# Patient Record
Sex: MALE | Race: OTHER | NOT HISPANIC OR LATINO | URBAN - METROPOLITAN AREA
[De-identification: names, ages, dates, MRNs, and addresses within clinical notes are randomized per-mention and may not be internally consistent; named-entity substitution may affect disease eponyms.]

---

## 2018-06-24 ENCOUNTER — EMERGENCY (EMERGENCY)
Facility: HOSPITAL | Age: 18
LOS: 1 days | Discharge: HOME | End: 2018-06-24
Attending: EMERGENCY MEDICINE | Admitting: EMERGENCY MEDICINE
Payer: MEDICAID

## 2018-06-24 VITALS
HEART RATE: 103 BPM | TEMPERATURE: 98 F | WEIGHT: 195.11 LBS | OXYGEN SATURATION: 98 % | SYSTOLIC BLOOD PRESSURE: 148 MMHG | RESPIRATION RATE: 17 BRPM | DIASTOLIC BLOOD PRESSURE: 69 MMHG

## 2018-06-24 LAB
ALBUMIN SERPL ELPH-MCNC: 4.5 G/DL — SIGNIFICANT CHANGE UP (ref 3.3–5)
ALP SERPL-CCNC: 64 U/L — SIGNIFICANT CHANGE UP (ref 40–120)
ALT FLD-CCNC: 21 U/L DA — SIGNIFICANT CHANGE UP (ref 10–45)
ANION GAP SERPL CALC-SCNC: 10 MMOL/L — SIGNIFICANT CHANGE UP (ref 5–17)
AST SERPL-CCNC: 18 U/L — SIGNIFICANT CHANGE UP (ref 10–40)
BASOPHILS # BLD AUTO: 0.1 K/UL — SIGNIFICANT CHANGE UP (ref 0–0.2)
BASOPHILS NFR BLD AUTO: 1.9 % — SIGNIFICANT CHANGE UP (ref 0–2)
BILIRUB SERPL-MCNC: 0.7 MG/DL — SIGNIFICANT CHANGE UP (ref 0.2–1.2)
BUN SERPL-MCNC: 11 MG/DL — SIGNIFICANT CHANGE UP (ref 7–23)
CALCIUM SERPL-MCNC: 9.1 MG/DL — SIGNIFICANT CHANGE UP (ref 8.4–10.5)
CHLORIDE SERPL-SCNC: 104 MMOL/L — SIGNIFICANT CHANGE UP (ref 96–108)
CO2 SERPL-SCNC: 30 MMOL/L — SIGNIFICANT CHANGE UP (ref 22–31)
CREAT SERPL-MCNC: 1.27 MG/DL — SIGNIFICANT CHANGE UP (ref 0.5–1.3)
EOSINOPHIL # BLD AUTO: 0.1 K/UL — SIGNIFICANT CHANGE UP (ref 0–0.5)
EOSINOPHIL NFR BLD AUTO: 1.7 % — SIGNIFICANT CHANGE UP (ref 0–6)
GLUCOSE BLDC GLUCOMTR-MCNC: 99 MG/DL — SIGNIFICANT CHANGE UP (ref 70–99)
GLUCOSE SERPL-MCNC: 67 MG/DL — LOW (ref 70–99)
HCT VFR BLD CALC: 45.9 % — SIGNIFICANT CHANGE UP (ref 39–50)
HGB BLD-MCNC: 16.5 G/DL — SIGNIFICANT CHANGE UP (ref 13–17)
LYMPHOCYTES # BLD AUTO: 1 K/UL — SIGNIFICANT CHANGE UP (ref 1–3.3)
LYMPHOCYTES # BLD AUTO: 21.4 % — SIGNIFICANT CHANGE UP (ref 13–44)
MCHC RBC-ENTMCNC: 31.7 PG — SIGNIFICANT CHANGE UP (ref 27–34)
MCHC RBC-ENTMCNC: 36 GM/DL — SIGNIFICANT CHANGE UP (ref 32–36)
MCV RBC AUTO: 87.9 FL — SIGNIFICANT CHANGE UP (ref 80–100)
MONOCYTES # BLD AUTO: 0.3 K/UL — SIGNIFICANT CHANGE UP (ref 0–0.9)
MONOCYTES NFR BLD AUTO: 7.2 % — SIGNIFICANT CHANGE UP (ref 1–9)
NEUTROPHILS # BLD AUTO: 3.1 K/UL — SIGNIFICANT CHANGE UP (ref 1.8–7.4)
NEUTROPHILS NFR BLD AUTO: 67.8 % — SIGNIFICANT CHANGE UP (ref 43–77)
PLATELET # BLD AUTO: 168 K/UL — SIGNIFICANT CHANGE UP (ref 150–400)
POTASSIUM SERPL-MCNC: 4.1 MMOL/L — SIGNIFICANT CHANGE UP (ref 3.5–5.3)
POTASSIUM SERPL-SCNC: 4.1 MMOL/L — SIGNIFICANT CHANGE UP (ref 3.5–5.3)
PROT SERPL-MCNC: 8.3 G/DL — SIGNIFICANT CHANGE UP (ref 6–8.3)
RBC # BLD: 5.22 M/UL — SIGNIFICANT CHANGE UP (ref 4.2–5.8)
RBC # FLD: 11.5 % — SIGNIFICANT CHANGE UP (ref 10.3–14.5)
SODIUM SERPL-SCNC: 144 MMOL/L — SIGNIFICANT CHANGE UP (ref 135–145)
WBC # BLD: 4.6 K/UL — SIGNIFICANT CHANGE UP (ref 3.8–10.5)
WBC # FLD AUTO: 4.6 K/UL — SIGNIFICANT CHANGE UP (ref 3.8–10.5)

## 2018-06-24 PROCEDURE — 36415 COLL VENOUS BLD VENIPUNCTURE: CPT

## 2018-06-24 PROCEDURE — 99284 EMERGENCY DEPT VISIT MOD MDM: CPT

## 2018-06-24 PROCEDURE — 80053 COMPREHEN METABOLIC PANEL: CPT

## 2018-06-24 PROCEDURE — 70450 CT HEAD/BRAIN W/O DYE: CPT

## 2018-06-24 PROCEDURE — 85027 COMPLETE CBC AUTOMATED: CPT

## 2018-06-24 PROCEDURE — 82962 GLUCOSE BLOOD TEST: CPT

## 2018-06-24 PROCEDURE — 70450 CT HEAD/BRAIN W/O DYE: CPT | Mod: 26

## 2018-06-24 RX ORDER — SODIUM CHLORIDE 9 MG/ML
1000 INJECTION INTRAMUSCULAR; INTRAVENOUS; SUBCUTANEOUS ONCE
Qty: 0 | Refills: 0 | Status: COMPLETED | OUTPATIENT
Start: 2018-06-24 | End: 2018-06-24

## 2018-06-24 RX ADMIN — SODIUM CHLORIDE 1000 MILLILITER(S): 9 INJECTION INTRAMUSCULAR; INTRAVENOUS; SUBCUTANEOUS at 16:30

## 2018-06-24 NOTE — ED PROVIDER NOTE - PROGRESS NOTE DETAILS
Ct negative. Pt at baseline. Pt ambulating, neurological intact. Repeat FS 99 after meal. Pt stable for discharge and to follow up with neurology outpt.

## 2018-06-24 NOTE — ED PROVIDER NOTE - OBJECTIVE STATEMENT
18 yr old male with no pmhx presents s/p seizure today at work. Pt co - worker at bedside, witnessed seizure. Pt was making a coffee, and then he had a tonic- clonic seizure, with LOC for 7 minutes as per witness. Pt baseline at this time. Pt has hx of 1 seizure 1 month ago, similar symptoms, pt was suppose to follow up with neurologist and never did. Pt reports taking a new " pre workout" for the last 2 weeks, as per pt friend also had recent seizure after taking pre workout creatinine. PT denies any headache, n/v/d, chest pain, sob or any other symptoms at this time.

## 2018-06-24 NOTE — ED PROVIDER NOTE - PSYCHIATRIC, MLM
[facility] notified.    Alert and oriented to person, place, time/situation. normal mood and affect. no apparent risk to self or others.

## 2018-06-24 NOTE — ED PROVIDER NOTE - MEDICAL DECISION MAKING DETAILS
18 yr old male with no pmhx presents s/p seizure today at work. Pt co - worker at bedside, witnessed seizure. Pt was making a coffee, and then he had a tonic- clonic seizure, with LOC for 7 minutes as per witness. Pt baseline at this time. Pt has hx of 1 seizure 1 month ago, similar symptoms, pt was suppose to follow up with neurologist and never did. Pt reports taking a new " pre workout" for the last 2 weeks, as per pt friend also had recent seizure after taking pre workout creatinine. : cbc, cmp, ivf, ct head - re eval, if at baseline and neurologically intact- will discharge with neurology follow up.

## 2018-06-24 NOTE — ED ADULT NURSE NOTE - OBJECTIVE STATEMENT
pt biba s/p seizure. as per pt coworker pt seizure lasted approx 7 min. pt states "I was twitching uncontrollably." pt is a/3, resp even and unlabored.  denies sob, n/v, pain. no further complaints. PA at bedside.

## 2018-06-24 NOTE — ED PROVIDER NOTE - ATTENDING CONTRIBUTION TO CARE
Pt with fracture to distal fib on left (lateral mall). Patient recc for non weight bearing and outpt f/u with FP clinic and ortho. Referral given. Wrapped in ace bndg with air cast and crutches. Crutches taught and managing well. I performed a face to face bedside interview with patient regarding history of present illness, review of symptoms and past medical history. I completed an independent physical exam.  I have discussed the patient's plan of care with Physician Assistant (PA). I agree with note as stated above, having amended the EMR as needed to reflect my findings.   This includes History of Present Illness, HIV, Past Medical/Surgical/Family/Social History, Allergies and Home Medications, Review of Systems, Physical Exam, and any Progress Notes during the time I functioned as the attending physician for this patient. Pt with seizure. H/O same. Glucose borderline low. Stable for discharge.  I performed a face to face bedside interview with patient regarding history of present illness, review of symptoms and past medical history. I completed an independent physical exam.  I have discussed the patient's plan of care with Physician Assistant (PA). I agree with note as stated above, having amended the EMR as needed to reflect my findings.   This includes History of Present Illness, HIV, Past Medical/Surgical/Family/Social History, Allergies and Home Medications, Review of Systems, Physical Exam, and any Progress Notes during the time I functioned as the attending physician for this patient.

## 2022-09-30 PROBLEM — R56.9 UNSPECIFIED CONVULSIONS: Chronic | Status: ACTIVE | Noted: 2018-06-24

## 2022-10-03 ENCOUNTER — APPOINTMENT (OUTPATIENT)
Dept: NEUROLOGY | Facility: CLINIC | Age: 22
End: 2022-10-03
Payer: COMMERCIAL

## 2022-10-03 PROCEDURE — 93040 RHYTHM ECG WITH REPORT: CPT

## 2022-10-03 PROCEDURE — 95816 EEG AWAKE AND DROWSY: CPT

## 2022-10-04 ENCOUNTER — NON-APPOINTMENT (OUTPATIENT)
Age: 22
End: 2022-10-04

## 2022-10-21 ENCOUNTER — APPOINTMENT (OUTPATIENT)
Dept: NEUROLOGY | Facility: CLINIC | Age: 22
End: 2022-10-21

## 2022-10-25 ENCOUNTER — APPOINTMENT (OUTPATIENT)
Dept: NEUROLOGY | Facility: CLINIC | Age: 22
End: 2022-10-25
Payer: COMMERCIAL

## 2022-10-25 VITALS
BODY MASS INDEX: 24.36 KG/M2 | WEIGHT: 200 LBS | SYSTOLIC BLOOD PRESSURE: 124 MMHG | HEIGHT: 76 IN | DIASTOLIC BLOOD PRESSURE: 78 MMHG

## 2022-10-25 PROCEDURE — 99215 OFFICE O/P EST HI 40 MIN: CPT

## 2022-10-25 NOTE — PHYSICAL EXAM
[FreeTextEntry1] : GENERAL APPEARANCE:\par In no acute distress. Alert & oriented. Behavior and affect appropriate to situation.\par SKIN:\par Warm and dry. No rash or suspicious lesions noted. Skin turgor - normal for age.\par HEENT:\par Neck supple. No thyromegaly. No lymphadenopathy.\par CARDIOVASCULAR:\par Regular rate and rhythm.\par \par NEUROLOGIC EXAM:\par MENTAL STATUS:\par Oriented to person, place and time.\par Speech is fluent, without dysarthria or aphasia.\par Recent and remote memory are intact.\par Attention span and concentration are normal.\par Adequate historian with normal fund of knowledge.\par Moods are consistent with situation, affect is euthymic.\par Thought process is coherent, content is without delusions or hallucinations.\par \par CRANIAL NERVES:\par CN 2: Visual fields are full to confrontation.\par CN 3, 4, 6: Extraocular movements are intact. No nystagmus or ophthalmoplegia is evident. Pupils are equally round and reactive to light and accommodation.\par CN 5: Facial sensation is intact in all 3 divisions.\par CN 7: Facial excursion - full and symmetric\par CN 8: Hearing is intact.\par CN 9,10,12: The palate elevates symmetrically to phonation. No dysphonia is noted. Tongue, uvula and palate are midline.\par CN 11: Shoulders shrug symmetrically. Sternocleidomastoids full strength bilaterally.\par MOTOR:\par Strength is 5/5 throughout for age and stature. Tone and bulk are normal, without gross atrophy, fasciculations or adventitious movements.\par \par SENSORY:\par Intact to light touch, temperature, sharp and vibratory perception in all four extremities without side to side asymmetry or proximal to distal gradient.\par REFLEXES:\par Biceps 2+ (R)  2+ (L)\par Triceps 2+ (R)  2+ (L)\par Brachiorad 2+ (R)  2+ (L)\par Patellar 2+ (R)  2+ (L)\par Achilles 1+ (R)  1+ (L)\par Plantar down down\par \par COORDINATION:\par Finger to nose and heel to shin testing without dysmetria or dysdiadochokinesia. Rapid alternating movements normal.\par GAIT:\par Able to walk without difficulty, normal Romberg and tandem walk.\par
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
## 2022-10-25 NOTE — ASSESSMENT
[FreeTextEntry1] : The patient is a 21 yo who has had epilepsy diagnosed since age 16. 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 Since he feels depressed while on Keppra, I recommend that we switch Keppra to Depakote at 500 mg BID. He can take Keppra 750 mg along with Depakote 500 mg tonight and start Depakote monotherapy from tomorrow morning onward. \par The patient agreed with the plan. \par The patient allowed me to discuss the plan with his mother, Maricarmen which I did on the phone. \par I encouraged him to weaning himself of Xanax gradually by cutting down 0.25 mg pills to 0.5 pills daily for one week then drop it to every other day for one week, then 2 days x 1 week then one day x 1 week then off. \par He agreed to do so.\par \par During the encounter, we discussed about the risk of Sudden Unexpected Death in Epilepsy (SUDEP) is an important concern. SUDEP refers to deaths in people with epilepsy that are not caused by injury, drowning, or other known causes. Studies suggest that each year there are about 1.16 cases of SUDEP for every 1,000 people with epilepsy. The risk increased to approximately 40 folds than normal population in the uncontrolled epilepsy population.\par \par A detailed discussion was made during this encounter. This included the patient's seizure type, possible etiology of epilepsy, common anti-seizure medication side effects and epilepsy specific safety issues: appropriate driving restrictions and injury prevention.\par \par [facility] requires a  to report any condition associated with loss of awareness and a person must be seizure free for one year to have a license renewed or issued. I advised that the patient restricts from driving or operating machinery that poses risk of injury until achieve 12 months of seizure free period\par \par Thirty minutes during the encounter is spent on discussing the above topics.\par

## 2022-11-28 ENCOUNTER — APPOINTMENT (OUTPATIENT)
Dept: NEUROLOGY | Facility: CLINIC | Age: 22
End: 2022-11-28
Payer: COMMERCIAL

## 2022-11-28 VITALS
HEIGHT: 76 IN | SYSTOLIC BLOOD PRESSURE: 108 MMHG | BODY MASS INDEX: 24.36 KG/M2 | DIASTOLIC BLOOD PRESSURE: 76 MMHG | WEIGHT: 200 LBS

## 2022-11-28 PROCEDURE — 99213 OFFICE O/P EST LOW 20 MIN: CPT

## 2022-11-28 NOTE — HISTORY OF PRESENT ILLNESS
[FreeTextEntry1] : The patient is a 23 yo who has been diagnosed with epilepsy at age 17 yo. \par He reported having on average one seizure per year with convulsion and tongue biting. He has never had any incontinence. Almost all of the seizure (on average once a year) occurred in the setting of sleep deprivation when he was studying for an exam. \par He has not had any breakthrough seizure since May 2022.\par \par He had multiple EEGs done in the past and he was told by a neurologist in the past that he has "generalized" type of epilepsy. His prior MRI brain was negative. He reported feeling morning arm jerking in the past but it improves after Keppra. \par \par In the last visit, he reported using Xanax from his friend due to his anxiety. He has weaned if off and has not taken any Xanax now for 9 days. He feels good that he finally quits it.\par In the last visit, Keppra was switched to Depakote 500 mg BID due to his irritable mood. He said it is better than Keppra and he has not had any s/e or breakthrough seizure. \par His mood is improving.\par He has applied for few jobs around [facility] for a .\par \par Epilepsy Type: FARHANA vs IGE\par Epilepsy Onset: 17 yo\par Frequency: once/year\par Last Seizure: May 2022 (sleep deprived provoked)\par Semiology: No aura - GTC\par MRI: Outside facility - reported as normal\par EEG: Reported as "generalized type epilepsy" from outside study. REEG at [facility] in 2022 - NL\par Current ASMs:  mg BID\par Past ASMs: Keppra - irritable mood\par Comorbidity: hx of Xanax abuse\par

## 2022-11-28 NOTE — PHYSICAL EXAM
[FreeTextEntry1] : NEUROLOGIC EXAM:\par MENTAL STATUS:\par Oriented to person, place and time.\par Speech is fluent, without dysarthria or aphasia.\par Recent and remote memory are intact.\par Attention span and concentration are normal.\par \par CRANIAL NERVES:\par CN 2: Visual fields are full to confrontation.\par CN 3, 4, 6: Extraocular movements are intact. No nystagmus or ophthalmoplegia is evident. Pupils are equally round and reactive to light.\par CN 7: Facial excursion is full and symmetric bilaterally.\par CN 9,10,12: No dysarthria\par MOTOR:\par Strength is 5/5 throughout without abnormal movement\par SENSORY:\par Intact to light touch in all four extremities without side to side asymmetry or proximal to distal gradient.\par GAIT:\par Normal gait\par

## 2022-11-28 NOTE — ASSESSMENT
[FreeTextEntry1] : The patient is a 21 yo who has been diagnosed with epilepsy at age 21 yo. Based on the history of description of his seizure, he is likely having FARHANA or IGE type of epilepsy.\par It is now controlled with  mg BID without side effect.\par He successfully weaned himself off Xanax.\par \par He will stay on Depakote 500 mg BID at this point.\par \par A detailed discussion was made during this encounter. This included the patient's seizure type, possible etiology of epilepsy, common anti-seizure medication side effects and epilepsy specific safety issues: appropriate driving restrictions and injury prevention i.e. using shower instead of bath tub, avoid climbing ladder, etc.\par The patient is made aware that alcohol consumption, most sicknesses (such as fever, diarrhea, vomiting, etc) and sleep deprivation could provoke the seizure.\par I recommend that the patient checks on www.epilepsy.com for an educational resource.\par \par [facility] requires a  to report any condition associated with loss of awareness and a person must be seizure free for one year to have a license renewed or issued. I advised that the patient restricts from driving or operating machinery that poses risk of injury until achieve 12 months of seizure free period\par \par \par

## 2022-12-19 ENCOUNTER — APPOINTMENT (OUTPATIENT)
Dept: NEUROLOGY | Facility: CLINIC | Age: 22
End: 2022-12-19
Payer: COMMERCIAL

## 2022-12-19 VITALS
DIASTOLIC BLOOD PRESSURE: 70 MMHG | SYSTOLIC BLOOD PRESSURE: 110 MMHG | WEIGHT: 200 LBS | BODY MASS INDEX: 24.36 KG/M2 | HEIGHT: 76 IN

## 2022-12-19 DIAGNOSIS — F39 UNSPECIFIED MOOD [AFFECTIVE] DISORDER: ICD-10-CM

## 2022-12-19 DIAGNOSIS — G40.B09 JUVENILE MYOCLONIC EPILEPSY, NOT INTRACTABLE, W/OUT STATUS EPILEPTICUS: ICD-10-CM

## 2022-12-19 DIAGNOSIS — S82.892A OTHER FRACTURE OF LEFT LOWER LEG, INITIAL ENCOUNTER FOR CLOSED FRACTURE: ICD-10-CM

## 2022-12-19 DIAGNOSIS — F13.10 SEDATIVE, HYPNOTIC OR ANXIOLYTIC ABUSE, UNCOMPLICATED: ICD-10-CM

## 2022-12-19 DIAGNOSIS — F41.9 ANXIETY DISORDER, UNSPECIFIED: ICD-10-CM

## 2022-12-19 DIAGNOSIS — S82.892D OTHER FRACTURE OF LEFT LOWER LEG, SUBSEQUENT ENCOUNTER FOR CLOSED FRACTURE WITH ROUTINE HEALING: ICD-10-CM

## 2022-12-19 PROCEDURE — 99214 OFFICE O/P EST MOD 30 MIN: CPT

## 2022-12-19 NOTE — ASSESSMENT
[FreeTextEntry1] : \par The patient is a 21 yo who has been diagnosed with epilepsy at age 21 yo. Based on the history of description of his seizure, he is likely having FARHANA or IGE type of epilepsy.\par He is on Depakote 500 mg BID but returned sooner to his f/u appt due to paroxymal episodes of feeling "foggy" and "vibration in his head". There is a concern that these are small seizure events. \par \par I recommend an EMU evaluation or ambulatory EEG 48-hr and he would like to proceed with the ambulatory EEG.\par The goal is to capture these typical paroxysmal events which will aid in further diagnosis and treatment.\par I also recommend a psychiatry referral to help evaluate and Rx his anxiety. He agreed.\par In the meantime, I am increasing VPA from 500 mg to 750 mg BID starting today. \par He agreed.\par \par I will see him for a f/u in 2 months.
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
## 2023-01-18 ENCOUNTER — APPOINTMENT (OUTPATIENT)
Dept: NEUROLOGY | Facility: CLINIC | Age: 23
End: 2023-01-18
Payer: COMMERCIAL

## 2023-01-18 PROCEDURE — 93040 RHYTHM ECG WITH REPORT: CPT

## 2023-01-18 PROCEDURE — 95816 EEG AWAKE AND DROWSY: CPT

## 2023-01-19 PROCEDURE — 95721 EEG PHY/QHP>36<60 HR W/O VID: CPT

## 2023-01-19 PROCEDURE — 95700 EEG CONT REC W/VID EEG TECH: CPT

## 2023-01-19 PROCEDURE — 95708 EEG WO VID EA 12-26HR UNMNTR: CPT

## 2023-02-28 ENCOUNTER — APPOINTMENT (OUTPATIENT)
Dept: NEUROLOGY | Facility: CLINIC | Age: 23
End: 2023-02-28

## 2023-05-10 ENCOUNTER — APPOINTMENT (OUTPATIENT)
Dept: NEUROLOGY | Facility: CLINIC | Age: 23
End: 2023-05-10
Payer: COMMERCIAL

## 2023-05-10 VITALS
HEART RATE: 83 BPM | DIASTOLIC BLOOD PRESSURE: 78 MMHG | BODY MASS INDEX: 27.85 KG/M2 | OXYGEN SATURATION: 100 % | TEMPERATURE: 98 F | RESPIRATION RATE: 16 BRPM | HEIGHT: 75 IN | SYSTOLIC BLOOD PRESSURE: 110 MMHG | WEIGHT: 224 LBS

## 2023-05-10 PROCEDURE — 99214 OFFICE O/P EST MOD 30 MIN: CPT

## 2023-05-10 RX ORDER — DIVALPROEX SODIUM 500 1/1
500 TABLET, EXTENDED RELEASE ORAL
Qty: 180 | Refills: 1 | Status: ACTIVE | COMMUNITY
Start: 2023-05-10 | End: 1900-01-01

## 2023-05-10 RX ORDER — DIVALPROEX SODIUM 500 MG/1
500 TABLET, DELAYED RELEASE ORAL TWICE DAILY
Qty: 60 | Refills: 2 | Status: DISCONTINUED | COMMUNITY
Start: 2022-10-25 | End: 2023-05-10

## 2023-05-10 RX ORDER — DIVALPROEX SODIUM 250 MG/1
250 TABLET, EXTENDED RELEASE ORAL
Qty: 180 | Refills: 1 | Status: ACTIVE | COMMUNITY
Start: 2023-05-10 | End: 1900-01-01

## 2023-05-10 RX ORDER — DIVALPROEX SODIUM 250 MG/1
250 TABLET, DELAYED RELEASE ORAL
Qty: 60 | Refills: 2 | Status: DISCONTINUED | COMMUNITY
Start: 2022-12-19 | End: 2023-05-10

## 2023-05-11 ENCOUNTER — NON-APPOINTMENT (OUTPATIENT)
Age: 23
End: 2023-05-11

## 2023-05-11 DIAGNOSIS — R79.89 OTHER SPECIFIED ABNORMAL FINDINGS OF BLOOD CHEMISTRY: ICD-10-CM

## 2023-05-11 LAB
ALBUMIN SERPL ELPH-MCNC: 5.5 G/DL
ALP BLD-CCNC: 46 U/L
ALT SERPL-CCNC: 29 U/L
ANION GAP SERPL CALC-SCNC: 16 MMOL/L
AST SERPL-CCNC: 27 U/L
BILIRUB SERPL-MCNC: 0.7 MG/DL
BUN SERPL-MCNC: 18 MG/DL
CALCIUM SERPL-MCNC: 10.3 MG/DL
CHLORIDE SERPL-SCNC: 99 MMOL/L
CO2 SERPL-SCNC: 27 MMOL/L
CREAT SERPL-MCNC: 1.32 MG/DL
EGFR: 77 ML/MIN/1.73M2
GLUCOSE SERPL-MCNC: 85 MG/DL
HCT VFR BLD CALC: 50.3 %
HGB BLD-MCNC: 16.5 G/DL
MCHC RBC-ENTMCNC: 29.6 PG
MCHC RBC-ENTMCNC: 32.8 GM/DL
MCV RBC AUTO: 90.3 FL
PLATELET # BLD AUTO: 204 K/UL
POTASSIUM SERPL-SCNC: 4 MMOL/L
PROT SERPL-MCNC: 8.1 G/DL
RBC # BLD: 5.57 M/UL
RBC # FLD: 12.5 %
SODIUM SERPL-SCNC: 141 MMOL/L
VALPROATE SERPL-MCNC: 126 UG/ML
WBC # FLD AUTO: 6.34 K/UL

## 2023-05-18 NOTE — HISTORY OF PRESENT ILLNESS
[FreeTextEntry1] : 24 yo man with FARHANA / IGE.\par \par Last visit, in December patient reported episodes of fogginess in head and/or vibrations in head, possibly anxiety related.  48-hr amb EEG was normal. \par \par His last GTC seizure occurred about 2 years ago in 2021.  Last year in May 2022 he had a bad reaction to drinking ETOH while taking Keppra but this was not a seizure.  He takes Depakote routinely, has gained some weight, but no other side effects.    \par \par \par Epilepsy Type: FARHANA vs IGE\par Epilepsy Onset: 15 yo\par Frequency: once/year\par Last Seizure: May 2022 (sleep deprived provoked)\par Semiology: No aura - GTC\par MRI: Outside facility - reported as normal\par EEG: Reported as "generalized type epilepsy" from outside study. REEG at [facility] in 2022 - NL\par Current ASMs:  mg BID\par Past ASMs: Keppra - irritable mood\par Comorbidity: hx of Xanax abuse\par

## 2023-05-18 NOTE — ASSESSMENT
[FreeTextEntry1] : 22 yo man with FARHANA / IGE.\par \par Plan:\par 1. Depakote ER 750mg PO BID\par 2. Labs: VPA level, CBC, CMP\par 3. Seizure precautions discussed.\par 4. Patient agrees with plan.\par 5. Follow up in 1 year\par \par Padilla Lopez MD\par [facility]\par \par Greater than 50% of the encounter was spent on counseling and coordination of care discussing differential diagnosis, diagnostic testing, and treatment options. We have talked about appropriate follow up, and I have spent 25 minutes of face to face time with the patient.\par \par More than 50% of time spent counseling and educating patient about epilepsy specific safety issues including ASM side effects and interactions, alcohol consumption, sleep deprivation, risks and driving privileges associated with the [place] Guidelines, what is SUDEP and death related to seizures/SUDEP, seizure 1st aid and risks. \par

## 2023-05-18 NOTE — PHYSICAL EXAM
[FreeTextEntry1] : Awake, alert, oriented x 3.  Language fluent.  Comprehension intact.  Naming intact.  Repetition intact.  Affect normal.  Cranial nerves grossly intact.  Motor exam: normal bulk, normal tone, 5/5 in all four extremities.  No tremors or fasciculations.  Sensory exam: intact to LT/PP/NEVA/vibration.  DTRs: 2+ throughout, flexor plantar response bilaterally, no clonus.  Coordination: no dysmetria.  Gait: normal toe/heel/tandem gait.  Romberg - negative\par

## 2024-03-14 NOTE — ED ADULT NURSE NOTE - PSH
----- Message from Issac Faust sent at 3/14/2024  9:03 AM CDT -----  Regarding: Rx needed  Contact: cintia at [phone]  Type: Needs Medical Advice    Who Called:  Cintia    Symptoms (please be specific):  Bronchitis ongoing   How long has patient had these symptoms:  pt went to hospital 2w ago    Pharmacy name and phone #:    [facility]  Address: [id number] , [place] [address]  Phone: [phone]    Best Call Back Number: [phone]    Additional Information: pt is asking for z-shady b/c still has symptoms. Please call        
No significant past surgical history